# Patient Record
Sex: MALE | Race: WHITE | NOT HISPANIC OR LATINO | URBAN - METROPOLITAN AREA
[De-identification: names, ages, dates, MRNs, and addresses within clinical notes are randomized per-mention and may not be internally consistent; named-entity substitution may affect disease eponyms.]

---

## 2017-12-31 ENCOUNTER — INPATIENT (INPATIENT)
Facility: HOSPITAL | Age: 57
LOS: 0 days | DRG: 308 | End: 2018-01-01
Attending: INTERNAL MEDICINE | Admitting: INTERNAL MEDICINE
Payer: COMMERCIAL

## 2017-12-31 VITALS — WEIGHT: 154.98 LBS | HEIGHT: 66 IN

## 2017-12-31 DIAGNOSIS — G25.3 MYOCLONUS: ICD-10-CM

## 2017-12-31 DIAGNOSIS — R57.9 SHOCK, UNSPECIFIED: ICD-10-CM

## 2017-12-31 DIAGNOSIS — J96.90 RESPIRATORY FAILURE, UNSPECIFIED, UNSPECIFIED WHETHER WITH HYPOXIA OR HYPERCAPNIA: ICD-10-CM

## 2017-12-31 DIAGNOSIS — I46.9 CARDIAC ARREST, CAUSE UNSPECIFIED: ICD-10-CM

## 2017-12-31 DIAGNOSIS — Z95.1 PRESENCE OF AORTOCORONARY BYPASS GRAFT: Chronic | ICD-10-CM

## 2017-12-31 DIAGNOSIS — E11.9 TYPE 2 DIABETES MELLITUS WITHOUT COMPLICATIONS: ICD-10-CM

## 2017-12-31 LAB
ALBUMIN SERPL ELPH-MCNC: 3.6 G/DL — SIGNIFICANT CHANGE UP (ref 3.3–5.2)
ALBUMIN SERPL ELPH-MCNC: 4 G/DL — SIGNIFICANT CHANGE UP (ref 3.3–5.2)
ALP SERPL-CCNC: 69 U/L — SIGNIFICANT CHANGE UP (ref 40–120)
ALP SERPL-CCNC: 74 U/L — SIGNIFICANT CHANGE UP (ref 40–120)
ALT FLD-CCNC: 110 U/L — HIGH
ALT FLD-CCNC: 70 U/L — HIGH
ANION GAP SERPL CALC-SCNC: 18 MMOL/L — HIGH (ref 5–17)
ANION GAP SERPL CALC-SCNC: 30 MMOL/L — HIGH (ref 5–17)
APTT BLD: 30.3 SEC — SIGNIFICANT CHANGE UP (ref 27.5–37.4)
APTT BLD: 31.4 SEC — SIGNIFICANT CHANGE UP (ref 27.5–37.4)
AST SERPL-CCNC: 110 U/L — HIGH
AST SERPL-CCNC: 67 U/L — HIGH
BILIRUB SERPL-MCNC: 0.3 MG/DL — LOW (ref 0.4–2)
BILIRUB SERPL-MCNC: <0.2 MG/DL — LOW (ref 0.4–2)
BUN SERPL-MCNC: 44 MG/DL — HIGH (ref 8–20)
BUN SERPL-MCNC: 48 MG/DL — HIGH (ref 8–20)
CALCIUM SERPL-MCNC: 8.9 MG/DL — SIGNIFICANT CHANGE UP (ref 8.6–10.2)
CALCIUM SERPL-MCNC: 9.7 MG/DL — SIGNIFICANT CHANGE UP (ref 8.6–10.2)
CHLORIDE SERPL-SCNC: 91 MMOL/L — LOW (ref 98–107)
CHLORIDE SERPL-SCNC: 98 MMOL/L — SIGNIFICANT CHANGE UP (ref 98–107)
CO2 SERPL-SCNC: 19 MMOL/L — LOW (ref 22–29)
CO2 SERPL-SCNC: 22 MMOL/L — SIGNIFICANT CHANGE UP (ref 22–29)
CREAT SERPL-MCNC: 1.69 MG/DL — HIGH (ref 0.5–1.3)
CREAT SERPL-MCNC: 1.72 MG/DL — HIGH (ref 0.5–1.3)
EOSINOPHIL NFR BLD AUTO: 1 % — SIGNIFICANT CHANGE UP (ref 0–6)
GAS PNL BLDA: SIGNIFICANT CHANGE UP
GAS PNL BLDA: SIGNIFICANT CHANGE UP
GLUCOSE SERPL-MCNC: 238 MG/DL — HIGH (ref 70–115)
GLUCOSE SERPL-MCNC: 329 MG/DL — HIGH (ref 70–115)
HCT VFR BLD CALC: 36.7 % — LOW (ref 42–52)
HCT VFR BLD CALC: 38.7 % — LOW (ref 42–52)
HGB BLD-MCNC: 11.5 G/DL — LOW (ref 14–18)
HGB BLD-MCNC: 11.5 G/DL — LOW (ref 14–18)
INR BLD: 1.27 RATIO — HIGH (ref 0.88–1.16)
INR BLD: 1.36 RATIO — HIGH (ref 0.88–1.16)
LYMPHOCYTES # BLD AUTO: 59 % — HIGH (ref 20–55)
MAGNESIUM SERPL-MCNC: 2.4 MG/DL — SIGNIFICANT CHANGE UP (ref 1.6–2.6)
MCHC RBC-ENTMCNC: 24.2 PG — LOW (ref 27–31)
MCHC RBC-ENTMCNC: 24.6 PG — LOW (ref 27–31)
MCHC RBC-ENTMCNC: 29.7 G/DL — LOW (ref 32–36)
MCHC RBC-ENTMCNC: 31.3 G/DL — LOW (ref 32–36)
MCV RBC AUTO: 78.4 FL — LOW (ref 80–94)
MCV RBC AUTO: 81.5 FL — SIGNIFICANT CHANGE UP (ref 80–94)
MONOCYTES NFR BLD AUTO: 6 % — SIGNIFICANT CHANGE UP (ref 3–10)
MYELOCYTES NFR BLD: 1 % — HIGH (ref 0–0)
NEUTROPHILS NFR BLD AUTO: 30 % — LOW (ref 37–73)
NEUTS BAND # BLD: 1 % — SIGNIFICANT CHANGE UP (ref 0–8)
PHOSPHATE SERPL-MCNC: 4.9 MG/DL — HIGH (ref 2.4–4.7)
PLAT MORPH BLD: NORMAL — SIGNIFICANT CHANGE UP
PLATELET # BLD AUTO: 234 K/UL — SIGNIFICANT CHANGE UP (ref 150–400)
PLATELET # BLD AUTO: 257 K/UL — SIGNIFICANT CHANGE UP (ref 150–400)
POTASSIUM SERPL-MCNC: 4.3 MMOL/L — SIGNIFICANT CHANGE UP (ref 3.5–5.3)
POTASSIUM SERPL-MCNC: 4.3 MMOL/L — SIGNIFICANT CHANGE UP (ref 3.5–5.3)
POTASSIUM SERPL-SCNC: 4.3 MMOL/L — SIGNIFICANT CHANGE UP (ref 3.5–5.3)
POTASSIUM SERPL-SCNC: 4.3 MMOL/L — SIGNIFICANT CHANGE UP (ref 3.5–5.3)
PROT SERPL-MCNC: 6.5 G/DL — LOW (ref 6.6–8.7)
PROT SERPL-MCNC: 6.6 G/DL — SIGNIFICANT CHANGE UP (ref 6.6–8.7)
PROTHROM AB SERPL-ACNC: 14 SEC — HIGH (ref 9.8–12.7)
PROTHROM AB SERPL-ACNC: 15 SEC — HIGH (ref 9.8–12.7)
RBC # BLD: 4.68 M/UL — SIGNIFICANT CHANGE UP (ref 4.6–6.2)
RBC # BLD: 4.75 M/UL — SIGNIFICANT CHANGE UP (ref 4.6–6.2)
RBC # FLD: 18.7 % — HIGH (ref 11–15.6)
RBC # FLD: 19.1 % — HIGH (ref 11–15.6)
RBC BLD AUTO: NORMAL — SIGNIFICANT CHANGE UP
SODIUM SERPL-SCNC: 138 MMOL/L — SIGNIFICANT CHANGE UP (ref 135–145)
SODIUM SERPL-SCNC: 140 MMOL/L — SIGNIFICANT CHANGE UP (ref 135–145)
TROPONIN T SERPL-MCNC: 0.03 NG/ML — SIGNIFICANT CHANGE UP (ref 0–0.06)
TROPONIN T SERPL-MCNC: 0.84 NG/ML — HIGH (ref 0–0.06)
VARIANT LYMPHS # BLD: 2 % — SIGNIFICANT CHANGE UP (ref 0–6)
WBC # BLD: 13.7 K/UL — HIGH (ref 4.8–10.8)
WBC # BLD: 5.7 K/UL — SIGNIFICANT CHANGE UP (ref 4.8–10.8)
WBC # FLD AUTO: 13.7 K/UL — HIGH (ref 4.8–10.8)
WBC # FLD AUTO: 5.7 K/UL — SIGNIFICANT CHANGE UP (ref 4.8–10.8)

## 2017-12-31 PROCEDURE — 99291 CRITICAL CARE FIRST HOUR: CPT | Mod: 25

## 2017-12-31 PROCEDURE — 92950 HEART/LUNG RESUSCITATION CPR: CPT

## 2017-12-31 PROCEDURE — 71010: CPT | Mod: 26

## 2017-12-31 PROCEDURE — 93010 ELECTROCARDIOGRAM REPORT: CPT

## 2017-12-31 PROCEDURE — 72125 CT NECK SPINE W/O DYE: CPT | Mod: 26

## 2017-12-31 PROCEDURE — 70450 CT HEAD/BRAIN W/O DYE: CPT | Mod: 26

## 2017-12-31 RX ORDER — HEPARIN SODIUM 5000 [USP'U]/ML
4000 INJECTION INTRAVENOUS; SUBCUTANEOUS ONCE
Qty: 0 | Refills: 0 | Status: COMPLETED | OUTPATIENT
Start: 2017-12-31 | End: 2017-12-31

## 2017-12-31 RX ORDER — LEVETIRACETAM 250 MG/1
1000 TABLET, FILM COATED ORAL ONCE
Qty: 0 | Refills: 0 | Status: COMPLETED | OUTPATIENT
Start: 2017-12-31 | End: 2017-12-31

## 2017-12-31 RX ORDER — AMIODARONE HYDROCHLORIDE 400 MG/1
1 TABLET ORAL
Qty: 900 | Refills: 0 | Status: DISCONTINUED | OUTPATIENT
Start: 2017-12-31 | End: 2018-01-01

## 2017-12-31 RX ORDER — PANTOPRAZOLE SODIUM 20 MG/1
40 TABLET, DELAYED RELEASE ORAL DAILY
Qty: 0 | Refills: 0 | Status: DISCONTINUED | OUTPATIENT
Start: 2017-12-31 | End: 2018-01-01

## 2017-12-31 RX ORDER — HEPARIN SODIUM 5000 [USP'U]/ML
INJECTION INTRAVENOUS; SUBCUTANEOUS
Qty: 25000 | Refills: 0 | Status: DISCONTINUED | OUTPATIENT
Start: 2017-12-31 | End: 2018-01-01

## 2017-12-31 RX ORDER — HEPARIN SODIUM 5000 [USP'U]/ML
4000 INJECTION INTRAVENOUS; SUBCUTANEOUS EVERY 6 HOURS
Qty: 0 | Refills: 0 | Status: DISCONTINUED | OUTPATIENT
Start: 2017-12-31 | End: 2018-01-01

## 2017-12-31 RX ORDER — CHLORHEXIDINE GLUCONATE 213 G/1000ML
15 SOLUTION TOPICAL
Qty: 0 | Refills: 0 | Status: DISCONTINUED | OUTPATIENT
Start: 2017-12-31 | End: 2018-01-01

## 2017-12-31 RX ORDER — AMIODARONE HYDROCHLORIDE 400 MG/1
0.5 TABLET ORAL
Qty: 900 | Refills: 0 | Status: DISCONTINUED | OUTPATIENT
Start: 2018-01-01 | End: 2018-01-01

## 2017-12-31 RX ORDER — LEVETIRACETAM 250 MG/1
500 TABLET, FILM COATED ORAL EVERY 12 HOURS
Qty: 0 | Refills: 0 | Status: DISCONTINUED | OUTPATIENT
Start: 2017-12-31 | End: 2018-01-01

## 2017-12-31 RX ORDER — NOREPINEPHRINE BITARTRATE/D5W 8 MG/250ML
0.2 PLASTIC BAG, INJECTION (ML) INTRAVENOUS
Qty: 8 | Refills: 0 | Status: DISCONTINUED | OUTPATIENT
Start: 2017-12-31 | End: 2018-01-01

## 2017-12-31 RX ORDER — SODIUM CHLORIDE 9 MG/ML
500 INJECTION, SOLUTION INTRAVENOUS ONCE
Qty: 0 | Refills: 0 | Status: DISCONTINUED | OUTPATIENT
Start: 2017-12-31 | End: 2018-01-01

## 2017-12-31 RX ORDER — SODIUM CHLORIDE 9 MG/ML
1000 INJECTION, SOLUTION INTRAVENOUS
Qty: 0 | Refills: 0 | Status: DISCONTINUED | OUTPATIENT
Start: 2017-12-31 | End: 2018-01-01

## 2017-12-31 RX ORDER — DOPAMINE HYDROCHLORIDE 40 MG/ML
5 INJECTION, SOLUTION, CONCENTRATE INTRAVENOUS
Qty: 400 | Refills: 0 | Status: DISCONTINUED | OUTPATIENT
Start: 2017-12-31 | End: 2017-12-31

## 2017-12-31 RX ORDER — SODIUM CHLORIDE 9 MG/ML
1000 INJECTION, SOLUTION INTRAVENOUS
Qty: 0 | Refills: 0 | Status: DISCONTINUED | OUTPATIENT
Start: 2017-12-31 | End: 2017-12-31

## 2017-12-31 RX ADMIN — DOPAMINE HYDROCHLORIDE 13.18 MICROGRAM(S)/KG/MIN: 40 INJECTION, SOLUTION, CONCENTRATE INTRAVENOUS at 19:19

## 2017-12-31 RX ADMIN — Medication 4 MILLIGRAM(S): at 22:27

## 2017-12-31 RX ADMIN — HEPARIN SODIUM 800 UNIT(S)/HR: 5000 INJECTION INTRAVENOUS; SUBCUTANEOUS at 22:26

## 2017-12-31 RX ADMIN — HEPARIN SODIUM 4000 UNIT(S): 5000 INJECTION INTRAVENOUS; SUBCUTANEOUS at 22:26

## 2017-12-31 RX ADMIN — PANTOPRAZOLE SODIUM 40 MILLIGRAM(S): 20 TABLET, DELAYED RELEASE ORAL at 22:29

## 2017-12-31 RX ADMIN — AMIODARONE HYDROCHLORIDE 33.33 MG/MIN: 400 TABLET ORAL at 19:18

## 2017-12-31 RX ADMIN — Medication 26.36 MICROGRAM(S)/KG/MIN: at 19:19

## 2017-12-31 RX ADMIN — SODIUM CHLORIDE 100 MILLILITER(S): 9 INJECTION, SOLUTION INTRAVENOUS at 23:29

## 2017-12-31 NOTE — H&P ADULT - PROBLEM SELECTOR PLAN 2
-Patient currently on Full vent support  -titrate settings to maintain SaO2 >90%, or pH >7.25  -consider low tidal volume ventilation strategy w/ goal Tv 4-6 cc/kg of ideal body weight  -plateu pressure goal <30  -Peridex oral care  -aggressive pulmonary toilet

## 2017-12-31 NOTE — PROCEDURE NOTE - NSSITEPREP_SKIN_A_CORE
chlorhexidine/Adherence to aseptic technique: hand hygiene prior to donning barriers (gown, gloves), don cap and mask, sterile drape over patient
chlorhexidine/Adherence to aseptic technique: hand hygiene prior to donning barriers (gown, gloves), don cap and mask, sterile drape over patient

## 2017-12-31 NOTE — ED ADULT NURSE NOTE - OBJECTIVE STATEMENT
Pt came into ED with no pulse, intubated and pt being bagged with BVM.  AS per EMS, family stated pt was leaving and wife heard a loud noise and went to see what had happened and pt was found unresponsive on floor.

## 2017-12-31 NOTE — CONSULT NOTE ADULT - SUBJECTIVE AND OBJECTIVE BOX
[facility], [facility]                [address] [address]                                     PHONE: [phone]                                       FAX: [phone]  [email or web address]    Reason for Consult: V fib arrest    HPI:  Patient is a 57 year old man with CAD with prior MI and multiple PCI s/p CABGx3, ischemic cardiomyopathy/VT s/p ICD, A fib (with inappropriate shock in the setting A fib with RVR), DM, HLD who was had sudden loss of consciousness while standing at the door found to have V fib who became pulseless en route to the ER s/p 20mins out of hospital CPR with 3 rounds of epi, with multiple ICD firing reported. Upon arrival to the ER was again pulseless and had another 20 minutes of CPR and an additional 3 rounds of epi and initiation of amiodarone prior to ROSC. No reported external defibrillation in the ER. Patient with myoclonic jerking and does not withdraw to painful stimuli off sedation. Currently intubated and undergoing hypothermic protocol. Per family at bedside, no reported chest pain.     PAST MEDICAL & SURGICAL HISTORY:  COPD (chronic obstructive pulmonary disease)  DM (diabetes mellitus)  HTN (hypertension)    PREVIOUS DIAGNOSTIC TESTING:    ECHO FINDINGS: TTE 20-25%    MEDICATIONS  (STANDING):  amiodarone Infusion 1 mG/Min (33.333 mL/Hr) IV Continuous <Continuous>  chlorhexidine 0.12% Liquid 15 milliLiter(s) Swish and Spit two times a day  heparin  Infusion.  Unit(s)/Hr (8 mL/Hr) IV Continuous <Continuous>  heparin  Injectable 4000 Unit(s) IV Push once  LORazepam   Injectable 4 milliGRAM(s) IV Push once  norepinephrine Infusion 0.2 MICROgram(s)/kG/Min (26.363 mL/Hr) IV Continuous <Continuous>  pantoprazole  Injectable 40 milliGRAM(s) IV Push daily    MEDICATIONS  (PRN):  heparin  Injectable 4000 Unit(s) IV Push every 6 hours PRN For aPTT less than 40    FAMILY HISTORY: unable to obtain    SOCIAL HISTORY:  CIGARETTES: unable to obtain   ALCOHOL: unable to obtain    ROS: intubated and sedated; unable to obtain     Vital Signs Last 24 Hrs  T(C): 36 (31 Dec 2017 20:45), Max: 36 (31 Dec 2017 20:45)  T(F): 96.8 (31 Dec 2017 20:45), Max: 96.8 (31 Dec 2017 20:45)  HR: 64 (31 Dec 2017 21:30) (52 - 94)  BP: 177/65 (31 Dec 2017 20:45) (93/64 - 177/65)  BP(mean): 102 (31 Dec 2017 20:45) (102 - 102)  RR: 18 (31 Dec 2017 21:30) (15 - 33)  SpO2: 100% (31 Dec 2017 21:30) (100% - 100%)    PHYSICAL EXAM:  General: intubated and sedated  HEENT: C collar in place, sclera anicteric   Neck; Supple, no nodes adenopathy, no NVD  CARDIOVASCULAR; 1/6 murmur, no rub, gallop or lift. Normal S1 and S2.  LUNGS; No rales, rhonchi or wheeze. Normal breath sounds bilaterally.  ABDOMEN ; Soft, nontender without mass or organomegaly. bowel sounds normoactive.  EXTREMITIES; No clubbing, cyanosis or edema.   SKIN; warm and dry   NEURO; Alert/oriented x 0, occasional myoclonic tics     INTERPRETATION OF TELEMETRY:    ECG: sinus bradycardia, LAD, inferlateral infarct age indeterminate, non-specific IVCD    I&O's Detail    31 Dec 2017 07:01  -  31 Dec 2017 22:26  --------------------------------------------------------  IN:    amiodarone Infusion: 66 mL  Total IN: 66 mL    OUT:    Indwelling Catheter - Urethral: 275 mL  Total OUT: 275 mL    Total NET: -209 mL    LABS:                        11.5   13.7  )-----------( 257      ( 31 Dec 2017 22:02 )             36.7     12-31    140  |  91<L>  |  44.0<H>  ----------------------------<  329<H>  4.3   |  19.0<L>  |  1.72<H>    Ca    8.9      31 Dec 2017 18:38    TPro  6.5<L>  /  Alb  3.6  /  TBili  <0.2<L>  /  DBili  x   /  AST  67<H>  /  ALT  70<H>  /  AlkPhos  69  12-31    CARDIAC MARKERS ( 31 Dec 2017 18:38 )  x     / 0.03 ng/mL / x     / x     / x        PT/INR - ( 31 Dec 2017 22:02 )   PT: 15.0 sec;   INR: 1.36 ratio    PTT - ( 31 Dec 2017 22:02 )  PTT:31.4 sec    I&O's Summary    31 Dec 2017 07:01  -  31 Dec 2017 22:26  --------------------------------------------------------  IN: 66 mL / OUT: 275 mL / NET: -209 mL      RADIOLOGY & ADDITIONAL STUDIES:  < from: CT Head No Cont (12.31.17 @ 20:34) >  CT Head: No acute intracranial hemorrhage or calvarial fracture.  CT cervical spine: No acute cervical spine fracture or evidence of   traumatic malalignment.

## 2017-12-31 NOTE — H&P ADULT - HISTORY OF PRESENT ILLNESS
55M w/ [employer] AICD, BiV device, prior  CABG, arrived after witnessed collapse, and loss of pulse. EMS called, intubated in field and ACLS protocol initiated, multiple rounds of epi given, arrived to [facility] ED with no pulse, ACLS resumed ROSC occured at 615pm. No mental status post ROSC, required pressors. Cooling protocol initiated Patient sent for head CT and sent to MICU for further care.  Roughly 35-40 minute down time

## 2017-12-31 NOTE — H&P ADULT - PROBLEM SELECTOR PLAN 5
-will add insulin sliding scale every 6 hours          40 minutes of critical care time spent at bedside evaluating/treating patient, reviewing labs/images, and discussing care with bedside team, consults, and family

## 2017-12-31 NOTE — H&P ADULT - ASSESSMENT
55M w/ [employer] AICD, BiV device, prior  CABG, arrived after witnessed collapse, and loss of pulse. EMS called, intubated in field and ACLS protocol initiated, multiple rounds of epi given, arrived to [facility] ED with no pulse, ACLS resumed ROSC occured at 615pm. No mental status post ROSC, required pressors. Cooling protocol initiated Patient sent for head CT and sent to MICU for further care.  Roughly 35-40 minute down time

## 2017-12-31 NOTE — CONSULT NOTE ADULT - ASSESSMENT
57 year old man with CAD with prior MI and multiple PCI s/p CABGx3, ischemic cardiomyopathy/VT s/p ICD, A fib (with inappropriate shock in the setting A fib with RVR), DM, HLD who was had sudden loss of consciousness while standing at the door found to have V fib arrest, concerning for possible ischemic event vs primary arrhythmic event in the setting of severely reduced LVEF    V fib Arrest  - concerning for possible ischemic event vs primary arrhythmic event in the setting of severely reduced LVEF  - continue amiodarone  - in the event of possible ischemic event, recommend anticoagulation if no contraindication, monitor coags closely   - ICD interrogation   - continue therapeutic hypothermia  - close monitoring of electrolytes  - serial EKG and cardiac enzymes  - continue ICU level care  - repeat TTE   - check urine tox   - neuro consult pending   - at this time given concern for possible anoxic injury, emergent LHC is deferred pending neurological improvement       Case discussed with nursing, family and ICU staff (JUAN DANIEL Adame). Critical care time 35 mins.

## 2017-12-31 NOTE — H&P ADULT - PROBLEM SELECTOR PLAN 1
-prolonged cardiac arresst  -In MICU now with signs of anoxia (myoclonic jerks, no withdrawl to pain, no corneal or pupillary response), however does have gag and is overbreathing vent.   -ECHO  -troponins  [employer] interrogation  -cardiology at [facility] now  -on modified hypothermia protocol   -on amiodarone gtt for vfib arrest,   -C spine cleared will remove C-collar  -Currently on Heparin drip, will titrate as per ACS nomogram  -have discussed at length with patient's family (wife and sons at bedside) of poor prognosis given signs of anoxia  -they understood and will re-assess goals of care after 48 hours

## 2017-12-31 NOTE — PROCEDURE NOTE - NSPROCDETAILS_GEN_ALL_CORE
lumen(s) aspirated and flushed/sterile dressing applied/guidewire recovered/sterile technique, catheter placed
Seldinger technique/all materials/supplies accounted for at end of procedure/positive blood return obtained via catheter/sutured in place/hemostasis with direct pressure, dressing applied/location identified, draped/prepped, sterile technique used, needle inserted/introduced/connected to a pressurized flush line

## 2017-12-31 NOTE — ED ADULT TRIAGE NOTE - CHIEF COMPLAINT QUOTE
BIBA, patient arrives to ED unresponsive in cardiac arrest, ALS protocol being carried out, no family present on arrival, as per ems, patient came home collapsed on the ground, 911 was called, and the patient had a weak and thready pulse on EMS arrival

## 2017-12-31 NOTE — H&P ADULT - PROBLEM SELECTOR PLAN 4
-will treat with benzos as needed and try keppra load, however typical antiepileptics usually have little affect on myoclonic jerks  -neuro eval in AM

## 2017-12-31 NOTE — H&P ADULT - NSHPLABSRESULTS_GEN_ALL_CORE
-[facility] No bleed, c spine un-remarkable

## 2017-12-31 NOTE — PROCEDURE NOTE - NSINDICATIONS_GEN_A_CORE
critical illness/venous access/emergency venous access/hypertonic/irritant infusion
arterial puncture to obtain ABG's/cannulation purposes/monitoring purposes

## 2017-12-31 NOTE — CHART NOTE - NSCHARTNOTEFT_GEN_A_CORE
-spoke with [employer] ([employer]) rep [employer], he will be coming in to interrogate the device
[employer] rep performed interrogation of device  patient  had mutliple VF events and shocks    17 events total between 518pm and 534pm
-spoke with Dr. Titus from [facility] cardiology    -as patient with poor mental status not a candidate for cardiac cath at this time.  -If Head CT shows no bleed will start heparin and she will see patient tonight    -[facility], head Ct shows no signs of bleed and c-spine is unremarkable as well    -will start heparin gtt, and D/C C-collar

## 2017-12-31 NOTE — ED PROVIDER NOTE - PHYSICAL EXAMINATION
Pt. intubated. Unresponsive. In cardiac arrest. Pt. intubated. Unresponsive. In cardiac arrest. NO signs of head trauma.

## 2017-12-31 NOTE — H&P ADULT - PROBLEM SELECTOR PLAN 3
-patient required levophed and dopamine   -currently off all pressors and maintaining MAP 65-70  -will re-start pressors if MAP <65

## 2017-12-31 NOTE — H&P ADULT - ATTENDING COMMENTS
Pt admitted overnight by [facility] as supervised by eICU physician. I have seen and evaluated this patient independently and agree with the above findings except as follows: 55 male with extensive cardiac history with ICD in place found down in VF arrest prolonged resuscitation efforts to obtain ROSC after approx 40minutes. Pt now with anoxic myoclonus. Amiodarone for VF suppression, hypothermia protocol although unclear of the value of TTM in a pt with obvious signs of anoxia. Wife at bedside updated. Will involve neuro, palliative, cardiology already following.

## 2017-12-31 NOTE — ED PROVIDER NOTE - OBJECTIVE STATEMENT
Pt. present to ED as cardiac arrest. As per wife, she heard the patient fall in the doorway, after he opened the front door. Pt. has hx of HTN/DM/AICD/COPD. As per EMS, the patient's device fired multiple times. EMS started ACLS protocol. Pt. was intubated by EMS. 3 EPis given by EMS prior to arrival. NO pulse was noted when pt. arrived in the ED. CPR was thus resumed. We obtained ROSC at 1814.  Pt. has C-Collar on.

## 2017-12-31 NOTE — H&P ADULT - MENTAL STATUS
patient with possible deberate posturing, non-reactive pupils, (-) corneals, is spontaneously breathing, (+) gag.   -withdrawms to noxious stimuli with left arm only, not oerh ext. withdraw

## 2018-01-01 VITALS — OXYGEN SATURATION: 59 % | HEART RATE: 93 BPM | RESPIRATION RATE: 24 BRPM

## 2018-01-01 DIAGNOSIS — G93.1 ANOXIC BRAIN DAMAGE, NOT ELSEWHERE CLASSIFIED: ICD-10-CM

## 2018-01-01 LAB
AMPHET UR-MCNC: NEGATIVE — SIGNIFICANT CHANGE UP
ANION GAP SERPL CALC-SCNC: 15 MMOL/L — SIGNIFICANT CHANGE UP (ref 5–17)
APTT BLD: 107.9 SEC — HIGH (ref 27.5–37.4)
BARBITURATES UR SCN-MCNC: NEGATIVE — SIGNIFICANT CHANGE UP
BASE EXCESS BLDA CALC-SCNC: 2.8 MMOL/L — HIGH (ref -2–2)
BENZODIAZ UR-MCNC: NEGATIVE — SIGNIFICANT CHANGE UP
BLOOD GAS COMMENTS ARTERIAL: SIGNIFICANT CHANGE UP
BUN SERPL-MCNC: 43 MG/DL — HIGH (ref 8–20)
CALCIUM SERPL-MCNC: 8.9 MG/DL — SIGNIFICANT CHANGE UP (ref 8.6–10.2)
CHLORIDE SERPL-SCNC: 102 MMOL/L — SIGNIFICANT CHANGE UP (ref 98–107)
CO2 SERPL-SCNC: 25 MMOL/L — SIGNIFICANT CHANGE UP (ref 22–29)
COCAINE METAB.OTHER UR-MCNC: NEGATIVE — SIGNIFICANT CHANGE UP
CREAT SERPL-MCNC: 1.29 MG/DL — SIGNIFICANT CHANGE UP (ref 0.5–1.3)
GAS PNL BLDA: SIGNIFICANT CHANGE UP
GLUCOSE SERPL-MCNC: 134 MG/DL — HIGH (ref 70–115)
HCO3 BLDA-SCNC: 27 MMOL/L — HIGH (ref 20–26)
HCT VFR BLD CALC: 33.5 % — LOW (ref 42–52)
HGB BLD-MCNC: 10.6 G/DL — LOW (ref 14–18)
HOROWITZ INDEX BLDA+IHG-RTO: SIGNIFICANT CHANGE UP
MAGNESIUM SERPL-MCNC: 2.4 MG/DL — SIGNIFICANT CHANGE UP (ref 1.6–2.6)
MCHC RBC-ENTMCNC: 24.4 PG — LOW (ref 27–31)
MCHC RBC-ENTMCNC: 31.6 G/DL — LOW (ref 32–36)
MCV RBC AUTO: 77.2 FL — LOW (ref 80–94)
METHADONE UR-MCNC: NEGATIVE — SIGNIFICANT CHANGE UP
OPIATES UR-MCNC: NEGATIVE — SIGNIFICANT CHANGE UP
PCO2 BLDA: 43 MMHG — SIGNIFICANT CHANGE UP (ref 35–45)
PCP SPEC-MCNC: SIGNIFICANT CHANGE UP
PCP UR-MCNC: NEGATIVE — SIGNIFICANT CHANGE UP
PH BLDA: 7.42 — SIGNIFICANT CHANGE UP (ref 7.35–7.45)
PHOSPHATE SERPL-MCNC: 3.2 MG/DL — SIGNIFICANT CHANGE UP (ref 2.4–4.7)
PLATELET # BLD AUTO: 215 K/UL — SIGNIFICANT CHANGE UP (ref 150–400)
PO2 BLDA: 134 MMHG — HIGH (ref 83–108)
POTASSIUM SERPL-MCNC: 4.3 MMOL/L — SIGNIFICANT CHANGE UP (ref 3.5–5.3)
POTASSIUM SERPL-SCNC: 4.3 MMOL/L — SIGNIFICANT CHANGE UP (ref 3.5–5.3)
RBC # BLD: 4.34 M/UL — LOW (ref 4.6–6.2)
RBC # FLD: 18.8 % — HIGH (ref 11–15.6)
SAO2 % BLDA: 99 % — SIGNIFICANT CHANGE UP (ref 95–99)
SODIUM SERPL-SCNC: 142 MMOL/L — SIGNIFICANT CHANGE UP (ref 135–145)
THC UR QL: NEGATIVE — SIGNIFICANT CHANGE UP
WBC # BLD: 16.8 K/UL — HIGH (ref 4.8–10.8)
WBC # FLD AUTO: 16.8 K/UL — HIGH (ref 4.8–10.8)

## 2018-01-01 PROCEDURE — 99291 CRITICAL CARE FIRST HOUR: CPT

## 2018-01-01 PROCEDURE — 93010 ELECTROCARDIOGRAM REPORT: CPT

## 2018-01-01 RX ORDER — PROPOFOL 10 MG/ML
20 INJECTION, EMULSION INTRAVENOUS
Qty: 1000 | Refills: 0 | Status: DISCONTINUED | OUTPATIENT
Start: 2018-01-01 | End: 2018-01-01

## 2018-01-01 RX ORDER — MORPHINE SULFATE 50 MG/1
5 CAPSULE, EXTENDED RELEASE ORAL
Qty: 100 | Refills: 0 | Status: DISCONTINUED | OUTPATIENT
Start: 2018-01-01 | End: 2018-01-01

## 2018-01-01 RX ORDER — ROBINUL 0.2 MG/ML
0.4 INJECTION INTRAMUSCULAR; INTRAVENOUS EVERY 4 HOURS
Qty: 0 | Refills: 0 | Status: DISCONTINUED | OUTPATIENT
Start: 2018-01-01 | End: 2018-01-01

## 2018-01-01 RX ORDER — MORPHINE SULFATE 50 MG/1
4 CAPSULE, EXTENDED RELEASE ORAL ONCE
Qty: 0 | Refills: 0 | Status: DISCONTINUED | OUTPATIENT
Start: 2018-01-01 | End: 2018-01-01

## 2018-01-01 RX ADMIN — CHLORHEXIDINE GLUCONATE 15 MILLILITER(S): 213 SOLUTION TOPICAL at 06:11

## 2018-01-01 RX ADMIN — ROBINUL 0.4 MILLIGRAM(S): 0.2 INJECTION INTRAMUSCULAR; INTRAVENOUS at 18:59

## 2018-01-01 RX ADMIN — HEPARIN SODIUM 0 UNIT(S)/HR: 5000 INJECTION INTRAVENOUS; SUBCUTANEOUS at 05:19

## 2018-01-01 RX ADMIN — MORPHINE SULFATE 4 MILLIGRAM(S): 50 CAPSULE, EXTENDED RELEASE ORAL at 18:20

## 2018-01-01 RX ADMIN — PANTOPRAZOLE SODIUM 40 MILLIGRAM(S): 20 TABLET, DELAYED RELEASE ORAL at 12:22

## 2018-01-01 RX ADMIN — Medication 2 MILLIGRAM(S): at 18:01

## 2018-01-01 RX ADMIN — MORPHINE SULFATE 4 MILLIGRAM(S): 50 CAPSULE, EXTENDED RELEASE ORAL at 18:14

## 2018-01-01 RX ADMIN — Medication 2 MILLIGRAM(S): at 20:08

## 2018-01-01 RX ADMIN — LEVETIRACETAM 420 MILLIGRAM(S): 250 TABLET, FILM COATED ORAL at 06:10

## 2018-01-01 RX ADMIN — AMIODARONE HYDROCHLORIDE 16.67 MG/MIN: 400 TABLET ORAL at 01:01

## 2018-01-01 RX ADMIN — Medication 4 MILLIGRAM(S): at 17:47

## 2018-01-01 RX ADMIN — MORPHINE SULFATE 5 MG/HR: 50 CAPSULE, EXTENDED RELEASE ORAL at 15:51

## 2018-01-01 RX ADMIN — Medication 2 MILLIGRAM(S): at 18:40

## 2018-01-01 RX ADMIN — PROPOFOL 8.44 MICROGRAM(S)/KG/MIN: 10 INJECTION, EMULSION INTRAVENOUS at 14:33

## 2018-01-01 RX ADMIN — LEVETIRACETAM 420 MILLIGRAM(S): 250 TABLET, FILM COATED ORAL at 17:38

## 2018-01-01 RX ADMIN — MORPHINE SULFATE 5 MG/HR: 50 CAPSULE, EXTENDED RELEASE ORAL at 16:00

## 2018-01-01 RX ADMIN — PROPOFOL 8.44 MICROGRAM(S)/KG/MIN: 10 INJECTION, EMULSION INTRAVENOUS at 09:01

## 2018-01-01 RX ADMIN — LEVETIRACETAM 400 MILLIGRAM(S): 250 TABLET, FILM COATED ORAL at 00:52

## 2018-01-01 RX ADMIN — SODIUM CHLORIDE 100 MILLILITER(S): 9 INJECTION, SOLUTION INTRAVENOUS at 09:01

## 2018-01-01 RX ADMIN — HEPARIN SODIUM 600 UNIT(S)/HR: 5000 INJECTION INTRAVENOUS; SUBCUTANEOUS at 06:12

## 2018-01-01 NOTE — PROGRESS NOTE ADULT - PROBLEM SELECTOR PLAN 4
Related to Anoxic Brain injury   Not interfering with vent support. No needed intervention at this time

## 2018-01-01 NOTE — PROGRESS NOTE ADULT - SUBJECTIVE AND OBJECTIVE BOX
Patient is a 55y old  Male who presents with a chief complaint of S/P Cardiac Arrest (31 Dec 2017 22:22)      BRIEF HOSPITAL COURSE: 55M w/ [employer] AICD, BiV device, prior  CABG, arrived after witnessed collapse, and loss of pulse. EMS called, intubated in field and ACLS protocol initiated, multiple rounds of epi given, arrived to [facility] ED with no pulse, ACLS resumed ROSC occured at 615pm. No mental status post ROSC, required pressors. Cooling protocol initiated Patient sent for head CT and sent to MICU for further care.  Roughly 35-40 minute down time    Events last 24 hours: myoclonic jerking, eye fluttering, no further VF on vent and amio    PAST MEDICAL & SURGICAL HISTORY:  COPD (chronic obstructive pulmonary disease)  DM (diabetes mellitus)  HTN (hypertension)  S/P CABG x 2      Review of Systems:  Cannot be obtained pt is obtunded and vented     Medications:    amiodarone Infusion 1 mG/Min IV Continuous <Continuous>  amiodarone Infusion 0.5 mG/Min IV Continuous <Continuous>  norepinephrine Infusion 0.2 MICROgram(s)/kG/Min IV Continuous <Continuous>      levETIRAcetam  IVPB 500 milliGRAM(s) IV Intermittent every 12 hours  propofol Infusion 20 MICROgram(s)/kG/Min IV Continuous <Continuous>        pantoprazole  Injectable 40 milliGRAM(s) IV Push daily        multiple electrolytes Injection Type 1 1000 milliLiter(s) IV Continuous <Continuous>      chlorhexidine 0.12% Liquid 15 milliLiter(s) Swish and Spit two times a day        Mode: AC/ CMV (Assist Control/ Continuous Mandatory Ventilation)  RR (machine): 20  TV (machine): 400  FiO2: 30  PEEP: 5  MAP: 9  PIP: 25      ICU Vital Signs Last 24 Hrs  T(C): 37.8 (01 Jan 2018 12:01), Max: 37.8 (01 Jan 2018 12:01)  T(F): 100 (01 Jan 2018 12:01), Max: 100 (01 Jan 2018 12:01)  HR: 65 (01 Jan 2018 11:00) (52 - 94)  BP: 108/61 (01 Jan 2018 08:00) (93/64 - 177/65)  BP(mean): 79 (01 Jan 2018 08:00) (79 - 102)  ABP: 112/52 (01 Jan 2018 11:00) (112/52 - 175/72)  ABP(mean): 71 (01 Jan 2018 11:00) (69 - 100)  RR: 20 (01 Jan 2018 11:00) (15 - 35)  SpO2: 100% (01 Jan 2018 11:00) (100% - 100%)      ABG - ( 01 Jan 2018 03:58 )  pH: 7.42  /  pCO2: 43    /  pO2: 134   / HCO3: 27    / Base Excess: 2.8   /  SaO2: 99                  I&O's Detail    31 Dec 2017 07:01  -  01 Jan 2018 07:00  --------------------------------------------------------  IN:    amiodarone Infusion: 100 mL    amiodarone Infusion: 132 mL    heparin  Infusion.: 62 mL    multiple electrolytes Injection Type 1: 800 mL    norepinephrine Infusion: 6.6 mL    propofol Infusion: 76.3 mL    Solution: 200 mL  Total IN: 1376.9 mL    OUT:    Indwelling Catheter - Urethral: 1225 mL  Total OUT: 1225 mL    Total NET: 151.9 mL      01 Jan 2018 07:01  -  01 Jan 2018 12:07  --------------------------------------------------------  IN:    amiodarone Infusion: 66.8 mL    heparin  Infusion.: 18 mL    multiple electrolytes Injection Type 1: 400 mL    propofol Infusion: 58.8 mL  Total IN: 543.6 mL    OUT:    Indwelling Catheter - Urethral: 330 mL  Total OUT: 330 mL    Total NET: 213.6 mL            LABS:                        10.6   16.8  )-----------( 215      ( 01 Jan 2018 04:33 )             33.5     01-01    142  |  102  |  43.0<H>  ----------------------------<  134<H>  4.3   |  25.0  |  1.29    Ca    8.9      01 Jan 2018 04:34  Phos  3.2     01-01  Mg     2.4     01-01    TPro  6.6  /  Alb  4.0  /  TBili  0.3<L>  /  DBili  x   /  AST  110<H>  /  ALT  110<H>  /  AlkPhos  74  12-31      CARDIAC MARKERS ( 31 Dec 2017 22:02 )  x     / 0.84 ng/mL / x     / x     / x      CARDIAC MARKERS ( 31 Dec 2017 18:38 )  x     / 0.03 ng/mL / x     / x     / x          CAPILLARY BLOOD GLUCOSE        PT/INR - ( 31 Dec 2017 22:02 )   PT: 15.0 sec;   INR: 1.36 ratio         PTT - ( 01 Jan 2018 04:33 )  PTT:107.9 sec    CULTURES:      Physical Examination:    General: No acute distress.      HEENT: Pupils dilated 5mm, slowly reactive to light.  Symmetric. persistent eye fluttering/blinking with eyes rolling upward    PULM: Coarse rales to auscultation bilaterally, moderate bloody sputum     CVS: Regular rate and rhythm, no murmurs, rubs, or gallops, ICD LUCW    ABD: Soft, nondistended, nontender, hypoactive bowel sounds,     EXT: No edema, nontender    SKIN: cool intact, no rashes noted.    NEURO: increased myoclonus to tactile/noxious stim, some decerebrate type posturing to deep noxious stim     RADIOLOGY:  < from: CT Cervical Spine No Cont (12.31.17 @ 20:37) >  PROCEDURE:   Noncontrast CT of the head and cervical spine. Coronal and Sagittal   reformats were obtained. Volume rendered 3-D imaging of the cervical   spine was obtained and uploaded to PACS for review.    FINDINGS:    CT head:    There is no acuteintracranial hemorrhage, mass effect, midline shift,   extra-axial collection, hydrocephalus, or evidence of acute vascular   territorial infarction. Chronic left inferior cerebellar infarct.    The visualized paranasal sinuses and mastoid air cells are clear. No   calvarial fracture. Fluid and debris within the oral and nasopharynx,   likely on the basis of intubation.    CT cervical spine:    No acute cervical spinal fracture or evidence of traumatic malalignment.   Preservation of the normal cervical lordosis. Craniocervical junction is   unremarkable. No facet joint dislocation. No prevertebral soft tissue   swelling.    Mild multilevel degenerative changes of the spine resulting in minimal   multilevel neuroforaminal and spinal canal narrowing. Posterior disc   extrusion identified at the C3-C4 level, resulting in partial effacement   of the ventral thecal sac.    Partially imaged endotracheal tube and left anterior chest wall cardiac   generator and leads.    IMPRESSION:     CT Head: No acute intracranial hemorrhage or calvarial fracture.    CT cervical spine: No acute cervical spine fracture or evidence of   traumatic malalignment.                NICOLAS VILLASEÑOR M.D., ATTENDING RADIOLOGIST  This document has been electronically signed. Dec 31 2017  9:56PM    < end of copied text >  < from: Xray Chest 1 View AP/PA. (12.31.17 @ 18:53) >  INTERPRETATION:  Portable chest radiograph        CLINICAL INFORMATION:   Cardiac arrest. Intubation.    TECHNIQUE:  Portable  AP view of the chest was obtained.    COMPARISON: No previous examinations are available for review.    FINDINGS: ET tube tip above tracheal bifurcation.    The  heart is enlarged in transverse diameter. No hilar mass. Trachea   midline.       . Status post median sternotomy. Status post post coronary artery bypass   graft procedure. Cardiac device wire lead is within right ventricle.    Cardiac device wire lead is within right ventricle.     The lungs  the mild increased perihilar patchy M opacities/pulmonary   edema. The lung bases are clear. No pleural effusion.         The heart and mediastinum are within normal limits.         Visualized osseous structures are intact.        IMPRESSION:   ET tube in place. Cardiomegaly. Early of bilateral patchy   perihilar opacities..              < end of copied text >    CRITICAL CARE TIME SPENT: 60

## 2018-01-01 NOTE — PROVIDER CONTACT NOTE (EICU) - RECOMMENDATIONS
C/W AC ventialtion. amiodarone drip. got asa/ plavix for ACS equivalent. on heparin as head Ct negative. C/W hypothermia protocol, avoid fevers. Propofol/Keppra for myoclonic status, a poor prognostic sign.  Overall, prognosis seems poor.

## 2018-01-01 NOTE — PROGRESS NOTE ADULT - PROBLEM SELECTOR PLAN 1
Related to underlying severe End Stage Cardiomyopathy. Had ROSC after a prolonged resuscitation   Now with signs of severe anoxic brain injury

## 2018-01-01 NOTE — PROGRESS NOTE ADULT - PROBLEM SELECTOR PLAN 2
Continue Full vent support  titrate settings to maintain SaO2 >90%, or pH >7.25  low tidal volume ventilation strategy   Peridex oral care
-Patient currently on Full vent support  -titrate settings to maintain SaO2 >90%, or pH >7.25  -consider low tidal volume ventilation strategy w/ goal Tv 4-6 cc/kg of ideal body wt  -plateu pressure goal <30  -Peridex oral care  Unable to perform SBT

## 2018-01-01 NOTE — DISCHARGE NOTE FOR THE EXPIRED PATIENT - SECONDARY DIAGNOSIS.
COPD (chronic obstructive pulmonary disease) Anoxic brain injury DM (diabetes mellitus) S/P CABG x 2

## 2018-01-01 NOTE — PROGRESS NOTE ADULT - PROBLEM SELECTOR PLAN 1
prolonged down time with obvious signs of anoxic encephalopathy  no value of cooling protocol in patient with prolonged arrest time and obvious anoxic signs  wife requested DNR this morning, will have another meeting later today with her and children and long distance family in [facility] prolonged down time with obvious signs of anoxic encephalopathy  no value of cooling protocol in patient with prolonged arrest time and obvious anoxic signs  wife requested DNR this morning, will have another meeting later today with her and children and long distance family in [facility]  Interrogation of device reveals approx 20min of VF with shocks deployed  continue Amio for VF suppression prolonged down time with obvious signs of anoxic encephalopathy  no value of cooling protocol in patient with prolonged arrest time and obvious anoxic signs  heparin gtts stopped due to bleeding from urethra and ETT  wife requested DNR this morning, will have another meeting later today with her and children and long distance family in [facility]  Interrogation of device reveals approx 20min of VF with shocks deployed  continue Amio for VF suppression

## 2018-01-01 NOTE — DISCHARGE NOTE FOR THE EXPIRED PATIENT - HOSPITAL COURSE
55M with ESCM, CHF, HTN, S/P CABG and S/P BiV PPM/AICD. Present with a cardiac arrest at home. He had a prolonged resuscitation of @40 mins. There was eventually ROSC.     This was an incessant VF arrest with multiple ICD firings     Family withdrew care today.  He passed peacefully in their presence

## 2018-01-01 NOTE — PROGRESS NOTE ADULT - SUBJECTIVE AND OBJECTIVE BOX
[place] [facility] - [facility], THE HEART CENTER                                   [address] [place] [address]                                                      PHONE: [phone]                                                         FAX: [phone]  [email or web address]  ---------------------------------------------------------------------------------------------------------------------------------    Overnight events/patient complaints:      PAST MEDICAL & SURGICAL HISTORY:  COPD (chronic obstructive pulmonary disease)  DM (diabetes mellitus)  HTN (hypertension)  S/P CABG x 2      Allergy Status Unknown    MEDICATIONS  (STANDING):  amiodarone Infusion 1 mG/Min (33.333 mL/Hr) IV Continuous <Continuous>  amiodarone Infusion 0.5 mG/Min (16.667 mL/Hr) IV Continuous <Continuous>  chlorhexidine 0.12% Liquid 15 milliLiter(s) Swish and Spit two times a day  heparin  Infusion.  Unit(s)/Hr (8 mL/Hr) IV Continuous <Continuous>  levETIRAcetam  IVPB 500 milliGRAM(s) IV Intermittent every 12 hours  multiple electrolytes Injection Type 1 1000 milliLiter(s) (100 mL/Hr) IV Continuous <Continuous>  multiple electrolytes Injection Type 1 Bolus 500 milliLiter(s) IV Bolus once  norepinephrine Infusion 0.2 MICROgram(s)/kG/Min (26.363 mL/Hr) IV Continuous <Continuous>  pantoprazole  Injectable 40 milliGRAM(s) IV Push daily  propofol Infusion 20 MICROgram(s)/kG/Min (8.436 mL/Hr) IV Continuous <Continuous>    MEDICATIONS  (PRN):  heparin  Injectable 4000 Unit(s) IV Push every 6 hours PRN For aPTT less than 40      Vital Signs Last 24 Hrs  T(C): 37.3 (01 Jan 2018 07:30), Max: 37.3 (01 Jan 2018 07:30)  T(F): 99.1 (01 Jan 2018 07:30), Max: 99.1 (01 Jan 2018 07:30)  HR: 66 (01 Jan 2018 07:30) (52 - 94)  BP: 177/65 (31 Dec 2017 20:45) (93/64 - 177/65)  BP(mean): 102 (31 Dec 2017 20:45) (102 - 102)  RR: 28 (01 Jan 2018 07:30) (15 - 35)  SpO2: 100% (01 Jan 2018 07:30) (100% - 100%)  ICU Vital Signs Last 24 Hrs  CR[place]ICAL [place]  I&O's Detail    31 Dec 2017 07:01  -  01 Jan 2018 07:00  --------------------------------------------------------  IN:    amiodarone Infusion: 100 mL    amiodarone Infusion: 132 mL    heparin  Infusion.: 62 mL    multiple electrolytes Injection Type 1: 800 mL    norepinephrine Infusion: 6.6 mL    propofol Infusion: 76.3 mL    Solution: 200 mL  Total IN: 1376.9 mL    OUT:    Indwelling Catheter - Urethral: 1225 mL  Total OUT: 1225 mL    Total NET: 151.9 mL        I&O's Summary    31 Dec 2017 07:01  -  01 Jan 2018 07:00  --------------------------------------------------------  IN: 1376.9 mL / OUT: 1225 mL / NET: 151.9 mL      Drug Dosing Weight  CRITICAL [place]  Mode: AC/ CMV (Assist Control/ Continuous Mandatory Ventilation), RR (machine): 20, TV (machine): 400, FiO2: 30, PEEP: 5, MAP: 9, PIP: 23    PHYSICAL EXAM:  General: Appears well developed, well nourished alert and cooperative.  HEENT: Head; normocephalic, atraumatic.  Eyes: Pupils reactive, cornea wnl.  Neck: Supple, no nodes adenopathy, no NVD or carotid bruit or thyromegaly.  CARDIOVASCULAR: Normal S1 and S2, No murmur, rub, gallop or lift.   LUNGS: No rales, rhonchi or wheeze. Normal breath sounds bilaterally.  ABDOMEN: Soft, nontender without mass or organomegaly. bowel sounds normoactive.  EXTREMITIES: No clubbing, cyanosis or edema. Distal pulses wnl.   SKIN: warm and dry with normal turgor.  NEURO: Alert/oriented x 3/normal motor exam. No pathologic reflexes.    PSYCH: normal affect.        LABS:                        10.6   16.8  )-----------( 215      ( 01 Jan 2018 04:33 )             33.5     01-01    142  |  102  |  43.0<H>  ----------------------------<  134<H>  4.3   |  25.0  |  1.29    Ca    8.9      01 Jan 2018 04:34  Phos  3.2     01-01  Mg     2.4     01-01    TPro  6.6  /  Alb  4.0  /  TBili  0.3<L>  /  DBili  x   /  AST  110<H>  /  ALT  110<H>  /  AlkPhos  74  12-31    CR[place]ICAL [place]  CARDIAC MARKERS ( 31 Dec 2017 22:02 )  x     / 0.84 ng/mL / x     / x     / x      CARDIAC MARKERS ( 31 Dec 2017 18:38 )  x     / 0.03 ng/mL / x     / x     / x          PT/INR - ( 31 Dec 2017 22:02 )   PT: 15.0 sec;   INR: 1.36 ratio         PTT - ( 01 Jan 2018 04:33 )  PTT:107.9 sec      RADIOLOGY & ADDITIONAL STUDIES:    INTERPRETATION OF TELEMETRY (personally reviewed):  57 year old man with CAD with prior MI and multiple PCI s/p CABGx3, ischemic cardiomyopathy/VT s/p ICD, A fib (with inappropriate shock in the setting A fib with RVR), DM, HLD who was had sudden loss of consciousness while standing at the door found to have V fib arrest, concerning for possible ischemic event vs primary arrhythmic event in the setting of severely reduced LVEF    V fib Arrest:  - concerning for possible ischemic event vs primary arrhythmic event in the setting of severely reduced LVEF  - continue amiodarone  - in the event of possible ischemic event, recommend anticoagulation if no contraindication, monitor coags closely   - ICD interrogation   - continue therapeutic hypothermia  - close monitoring of electrolytes  - serial EKG and cardiac enzymes  - continue ICU level care  - repeat TTE   - check urine tox   - neuro consult pending   - at this time given concern for possible anoxic injury, emergent LHC is deferred pending neurological improvement     Thank you for allowing [facility] to participate in the care of this patient.  Please feel free to call with any questions or concerns. [place] [facility] - [facility], THE HEART CENTER                                   [address] [place] [address]                                                      PHONE: [phone]                                                         FAX: [phone]  [email or web address]  ---------------------------------------------------------------------------------------------------------------------------------    Overnight events/patient complaints:  no events overnight     PAST MEDICAL & SURGICAL HISTORY:  COPD (chronic obstructive pulmonary disease)  DM (diabetes mellitus)  HTN (hypertension)  S/P CABG x 2      Allergy Status Unknown    MEDICATIONS  (STANDING):  amiodarone Infusion 1 mG/Min (33.333 mL/Hr) IV Continuous <Continuous>  amiodarone Infusion 0.5 mG/Min (16.667 mL/Hr) IV Continuous <Continuous>  chlorhexidine 0.12% Liquid 15 milliLiter(s) Swish and Spit two times a day  heparin  Infusion.  Unit(s)/Hr (8 mL/Hr) IV Continuous <Continuous>  levETIRAcetam  IVPB 500 milliGRAM(s) IV Intermittent every 12 hours  multiple electrolytes Injection Type 1 1000 milliLiter(s) (100 mL/Hr) IV Continuous <Continuous>  multiple electrolytes Injection Type 1 Bolus 500 milliLiter(s) IV Bolus once  norepinephrine Infusion 0.2 MICROgram(s)/kG/Min (26.363 mL/Hr) IV Continuous <Continuous>  pantoprazole  Injectable 40 milliGRAM(s) IV Push daily  propofol Infusion 20 MICROgram(s)/kG/Min (8.436 mL/Hr) IV Continuous <Continuous>    MEDICATIONS  (PRN):  heparin  Injectable 4000 Unit(s) IV Push every 6 hours PRN For aPTT less than 40      Vital Signs Last 24 Hrs  T(C): 37.3 (01 Jan 2018 07:30), Max: 37.3 (01 Jan 2018 07:30)  T(F): 99.1 (01 Jan 2018 07:30), Max: 99.1 (01 Jan 2018 07:30)  HR: 66 (01 Jan 2018 07:30) (52 - 94)  BP: 177/65 (31 Dec 2017 20:45) (93/64 - 177/65)  BP(mean): 102 (31 Dec 2017 20:45) (102 - 102)  RR: 28 (01 Jan 2018 07:30) (15 - 35)  SpO2: 100% (01 Jan 2018 07:30) (100% - 100%)  ICU Vital Signs Last 24 Hrs  CR[place]ICAL [place]  I&O's Detail    31 Dec 2017 07:01  -  01 Jan 2018 07:00  --------------------------------------------------------  IN:    amiodarone Infusion: 100 mL    amiodarone Infusion: 132 mL    heparin  Infusion.: 62 mL    multiple electrolytes Injection Type 1: 800 mL    norepinephrine Infusion: 6.6 mL    propofol Infusion: 76.3 mL    Solution: 200 mL  Total IN: 1376.9 mL    OUT:    Indwelling Catheter - Urethral: 1225 mL  Total OUT: 1225 mL    Total NET: 151.9 mL        I&O's Summary    31 Dec 2017 07:01  -  01 Jan 2018 07:00  --------------------------------------------------------  IN: 1376.9 mL / OUT: 1225 mL / NET: 151.9 mL      Drug Dosing Weight  CRITICAL [place]  Mode: AC/ CMV (Assist Control/ Continuous Mandatory Ventilation), RR (machine): 20, TV (machine): 400, FiO2: 30, PEEP: 5, MAP: 9, PIP: 23    PHYSICAL EXAM:  General: Appears well developed, well nourished alert and cooperative.  HEENT: Head; normocephalic, atraumatic.  Eyes: Pupils reactive, cornea wnl.  Neck: Supple, no nodes adenopathy, no NVD or carotid bruit or thyromegaly.  CARDIOVASCULAR: Normal S1 and S2, No murmur, rub, gallop or lift.   LUNGS: No rales, rhonchi or wheeze. Normal breath sounds bilaterally.  ABDOMEN: Soft, nontender without mass or organomegaly. bowel sounds normoactive.  EXTREMITIES: No clubbing, cyanosis or edema. Distal pulses wnl.   SKIN: warm and dry with normal turgor.  NEURO: Alert/oriented x 3/normal motor exam. No pathologic reflexes.    PSYCH: normal affect.        LABS:                        10.6   16.8  )-----------( 215      ( 01 Jan 2018 04:33 )             33.5     01-01    142  |  102  |  43.0<H>  ----------------------------<  134<H>  4.3   |  25.0  |  1.29    Ca    8.9      01 Jan 2018 04:34  Phos  3.2     01-01  Mg     2.4     01-01    TPro  6.6  /  Alb  4.0  /  TBili  0.3<L>  /  DBili  x   /  AST  110<H>  /  ALT  110<H>  /  AlkPhos  74  12-31    CRITICAL [place]  CARDIAC MARKERS ( 31 Dec 2017 22:02 )  x     / 0.84 ng/mL / x     / x     / x      CARDIAC MARKERS ( 31 Dec 2017 18:38 )  x     / 0.03 ng/mL / x     / x     / x          PT/INR - ( 31 Dec 2017 22:02 )   PT: 15.0 sec;   INR: 1.36 ratio         PTT - ( 01 Jan 2018 04:33 )  PTT:107.9 sec      RADIOLOGY & ADDITIONAL STUDIES:    INTERPRETATION OF TELEMETRY (personally reviewed):  57 year old man with CAD with prior MI and multiple PCI s/p CABGx3, ischemic cardiomyopathy/VT s/p ICD, A fib (with inappropriate shock in the setting A fib with RVR), DM, HLD who was had sudden loss of consciousness while standing at the door found to have V fib arrest, concerning for possible ischemic event vs primary arrhythmic event in the setting of severely reduced LVEF    V fib Arrest:  - concerning for possible ischemic event vs primary arrhythmic event in the setting of severely reduced LVEF  - continue amiodarone  - on heparin gtt  - ICD interrogated- VF   - continue therapeutic hypothermia  - close monitoring of electrolytes  - serial EKG and cardiac enzymes  - continue ICU level care- on hypothermia protocol    - at this time given concern for possible anoxic injury, emergent LHC is deferred pending neurological improvement   -Poor neuro status-     Thank you for allowing [facility] to participate in the care of this patient.  Please feel free to call with any questions or concerns.

## 2018-01-01 NOTE — PROGRESS NOTE ADULT - PROBLEM SELECTOR PLAN 4
-will treat with benzos as needed and try keppra load, however typical antiepileptics usually have little affect on myoclonic jerks evidence that there is diffuse axonal injury from anoxia   continue propofol to quiet myoclonus  given Keppra overnight -typical antiepileptics usually have little affect on myoclonic jerks  may use intermittent Ativan to assist in control of myoclonus as it may help comfort family

## 2018-01-01 NOTE — PROGRESS NOTE ADULT - ATTENDING COMMENTS
Pt admitted overnight by [facility] as supervised by eICU physician. I have seen and evaluated this patient independently and agree with the above findings except as follows: 55 male with extensive cardiac history with ICD in place found down in VF arrest prolonged resuscitation efforts to obtain ROSC after approx 40minutes. Pt now with anoxic myoclonus. Amiodarone for VF suppression, hypothermia protocol although unclear of the value of TTM in a pt with obvious signs of anoxia. Wife at bedside updated. Will involve neuro, palliative, cardiology already following. Pt with overall grave prognosis after prolonged cardiac arrest with obvious signs of anoxia. Ongoing discussions with wife and children about GOC. Pt is DNR.

## 2018-01-01 NOTE — PROGRESS NOTE ADULT - SUBJECTIVE AND OBJECTIVE BOX
Patient is a 55y old  Male who presents with a chief complaint of S/P Cardiac Arrest (31 Dec 2017 22:22)      BRIEF HOSPITAL COURSE: 55M with ESCM, CHF, HTN, S/P CABG and S/P BiV PPM/AICD. Present with a cardiac arrest at home. he had a prolonged resuscitation of @40 mins. There was eventually ROSC. He was intubated on full vent support and initially on Vasopressors but they were able to wean off over night.    Events last 24 hours:     PAST MEDICAL & SURGICAL HISTORY:  COPD (chronic obstructive pulmonary disease)  DM (diabetes mellitus)  HTN (hypertension)  S/P CABG x 2      Review of Systems:  CONSTITUTIONAL: No fever, chills, or fatigue  EYES: No eye pain, visual disturbances, or discharge  ENMT:  No difficulty hearing, tinnitus, vertigo; No sinus or throat pain  NECK: No pain or stiffness  RESPIRATORY: + shortness of breath/Dyspnea with movement,   CARDIOVASCULAR: No chest pain, + palpitations, dizziness, +leg swelling  GASTROINTESTINAL: No abdominal or epigastric pain. No nausea, vomiting, or hematemesis; No diarrhea or constipation. No melena or hematochezia.  GENITOURINARY: No dysuria, frequency, hematuria, or incontinence  NEUROLOGICAL: No headaches, memory loss, loss of strength, numbness, or tremors  SKIN: No itching, burning, rashes, or lesions   MUSCULOSKELETAL: No joint pain or swelling; No muscle, back, or extremity pain  PSYCHIATRIC: No depression, anxiety, mood swings,  +difficulty sleeping      Medications:    amiodarone Infusion 1 mG/Min IV Continuous <Continuous>  amiodarone Infusion 0.5 mG/Min IV Continuous <Continuous>  norepinephrine Infusion 0.2 MICROgram(s)/kG/Min IV Continuous <Continuous>  levETIRAcetam  IVPB 500 milliGRAM(s) IV Intermittent every 12 hours  propofol Infusion 20 MICROgram(s)/kG/Min IV Continuous <Continuous>  pantoprazole  Injectable 40 milliGRAM(s) IV Push daily  multiple electrolytes Injection Type 1 1000 milliLiter(s) IV Continuous <Continuous>  chlorhexidine 0.12% Liquid 15 milliLiter(s) Swish and Spit two times a day      Mode: AC/ CMV (Assist Control/ Continuous Mandatory Ventilation)  RR (machine): 20  TV (machine): 400  FiO2: 30  PEEP: 5  MAP: 10  PIP: 29    ICU Vital Signs Last 24 Hrs  T(C): 37.8 (01 Jan 2018 12:01), Max: 37.8 (01 Jan 2018 12:01)  T(F): 100 (01 Jan 2018 12:01), Max: 100 (01 Jan 2018 12:01)  HR: 62 (01 Jan 2018 13:00) (52 - 94)  BP: 108/61 (01 Jan 2018 08:00) (93/64 - 177/65)  BP(mean): 79 (01 Jan 2018 08:00) (79 - 102)  ABP: 105/46 (01 Jan 2018 13:00) (105/46 - 175/72)  ABP(mean): 63 (01 Jan 2018 13:00) (63 - 100)  RR: 20 (01 Jan 2018 13:00) (15 - 35)  SpO2: 100% (01 Jan 2018 13:00) (100% - 100%)      ABG - ( 01 Jan 2018 03:58 )  pH: 7.42  /  pCO2: 43    /  pO2: 134   / HCO3: 27    / Base Excess: 2.8   /  SaO2: 99          I&O's Detail    31 Dec 2017 07:01  -  01 Jan 2018 07:00  --------------------------------------------------------  IN:    amiodarone Infusion: 100 mL    amiodarone Infusion: 132 mL    heparin  Infusion.: 62 mL    multiple electrolytes Injection Type 1: 800 mL    norepinephrine Infusion: 6.6 mL    propofol Infusion: 76.3 mL    Solution: 200 mL  Total IN: 1376.9 mL    OUT:    Indwelling Catheter - Urethral: 1225 mL  Total OUT: 1225 mL    Total NET: 151.9 mL      01 Jan 2018 07:01  -  01 Jan 2018 14:10  --------------------------------------------------------  IN:    amiodarone Infusion: 116.9 mL    heparin  Infusion.: 18 mL    multiple electrolytes Injection Type 1: 700 mL    propofol Infusion: 102.9 mL  Total IN: 937.8 mL    OUT:    Indwelling Catheter - Urethral: 490 mL  Total OUT: 490 mL    Total NET: 447.8 mL            LABS:                        10.6   16.8  )-----------( 215      ( 01 Jan 2018 04:33 )             33.5     01-01    142  |  102  |  43.0<H>  ----------------------------<  134<H>  4.3   |  25.0  |  1.29    Ca    8.9      01 Jan 2018 04:34  Phos  3.2     01-01  Mg     2.4     01-01    TPro  6.6  /  Alb  4.0  /  TBili  0.3<L>  /  DBili  x   /  AST  110<H>  /  ALT  110<H>  /  AlkPhos  74  12-31      CARDIAC MARKERS ( 31 Dec 2017 22:02 )  x     / 0.84 ng/mL / x     / x     / x      CARDIAC MARKERS ( 31 Dec 2017 18:38 )  x     / 0.03 ng/mL / x     / x     / x          CAPILLARY BLOOD GLUCOSE        PT/INR - ( 31 Dec 2017 22:02 )   PT: 15.0 sec;   INR: 1.36 ratio         PTT - ( 01 Jan 2018 04:33 )  PTT:107.9 sec    CULTURES:      Physical Examination:    General: UNresponsive to verbal or deep stimuli intubated on full vent support    HEENT: Pupils dilated with minimal response to light     PULM: Clear to auscultation bilaterally, no significant sputum production    CVS: Regular rate and rhythm, no murmurs, rubs, or gallops    ABD: Soft, nondistended, nontender, hypoactive bowel sounds, no masses    EXT: 1+edema B/L, nontender, no spontaneous or purposeful movements, + Myoclonus     SKIN: Cool fingers and toes B/L     RADIOLOGY: CXR: FINDINGS: ET tube tip above tracheal bifurcation.    The  heart is enlarged in transverse diameter. No hilar mass. Trachea   midline.       . Status post median sternotomy. Status post post coronary artery bypass   graft procedure. Cardiac device wire lead is within right ventricle.    Cardiac device wire lead is within right ventricle.     The lungs  the mild increased perihilar patchy M opacities/pulmonary   edema. The lung bases are clear. No pleural effusion.             CRITICAL CARE TIME SPENT: 60 mins

## 2018-01-01 NOTE — PROGRESS NOTE ADULT - PROBLEM SELECTOR PLAN 5
-will add insulin sliding scale every 6 hours          40 minutes of critical care time spent at bedside evaluating/treating patient, reviewing labs/images, and discussing care with bedside team, consults, and family insulin sliding scale every 6 hours

## 2018-01-01 NOTE — PROVIDER CONTACT NOTE (EICU) - BACKGROUND
55 M with extensive cardiac Hx post ICU a/w sudden cardiac death, per verbal report 40 minuted downtime VF/ VT arrest. Imntubated and ROSC obtained. WE called [facility] to interrogate ICD revealed > 10 ICD shocks. Although not brain dead as is overbreathing and biting ETT, seems as if has anoxic encephalopathy and is having myoclonus. cards consulted and given neuro exam not candidate for cath at this time. Is on hypothermia protocol. 55 M with extensive cardiac Hx post ICD a/w sudden cardiac death, per verbal report 40 minuted downtime VF/ VT arrest. Imntubated and ROSC obtained. WE called [facility] to interrogate ICD revealed > 10 ICD shocks. Although not brain dead as is overbreathing and biting ETT, seems as if has anoxic encephalopathy and is having myoclonus. cards consulted and given neuro exam not candidate for cath at this time. Is on hypothermia protocol.

## 2018-01-01 NOTE — PROGRESS NOTE ADULT - ASSESSMENT
55M with ESCM, CHF, HTN, S/P CABG and S/P BiV PPM/AICD. Present with a cardiac arrest at home. She had a prolonged resuscitation of @40 mins. There was eventually ROSC. He was intubated on full vent support and initially on Vasopressors but they were able to wean off over night.    I sat with the patient's wife (Leisa) and 2 sons today. They clearly understand the severity of his condition particularly the severity of his brain injury. Leisa explained that her  knew that he was declining. SHe said he even tookk a trip to [facility] to visit his family recently to see them one more time. She said that he never wanted to be on on any machines or life support. They had had conversations about this over the last few years and he was clear that he did not want to be kept alive artificially. The son's also agree with their father's feelings about this. They are sad and tearful,  but understand his condition and that their is not any reasonable hope for him to improve neurologically. They have requested that he be remioved from life support and be allowed to pass away peacefully and naturally.     A DNR/I order has been put in place at this point. Additionally they asked for us to speak with the patient's brother in [facility]. Utilizing the translation service,  a conference call was organized with LEISA, her 2 sons, sanket and the patient's brother Lorraine.  Lorraine understood his brother's condition and supports their decision regarding making him comfortable and allowing him to pass peacefully.    We will proceed with comfort measures at this time and deactivate his AICD.    A total of 70 mins was spent evaluating/treating this critical patient including goals of care conversations with the family.
55M w/ [facility] AICD, BiV device, prior  CABG, arrived after witnessed collapse, and loss of pulse. EMS called, intubated in field and ACLS protocol initiated, multiple rounds of epi given, arrived to [facility] ED with no pulse, ACLS resumed ROSC occured at 615pm. No mental status post ROSC, required pressors. Cooling protocol initiated Patient sent for head CT and sent to MICU for further care.  Roughly 35-40 minute down time

## 2018-01-22 PROBLEM — Z00.00 ENCOUNTER FOR PREVENTIVE HEALTH EXAMINATION: Noted: 2018-01-22

## 2019-10-28 NOTE — CONSULT NOTE ADULT - CONSULT REQUESTED BY NAME
LM for patient to call back to schedule H&P and PATs   Tu FRANCES Clindamycin Counseling: I counseled the patient regarding use of clindamycin as an antibiotic for prophylactic and/or therapeutic purposes. Clindamycin is active against numerous classes of bacteria, including skin bacteria. Side effects may include nausea, diarrhea, gastrointestinal upset, rash, hives, yeast infections, and in rare cases, colitis.

## 2023-08-22 NOTE — H&P ADULT - REASON FOR ADMISSION
S/P Cardiac Arrest Azithromycin Counseling:  I discussed with the patient the risks of azithromycin including but not limited to GI upset, allergic reaction, drug rash, diarrhea, and yeast infections.